# Patient Record
Sex: FEMALE | Race: WHITE | NOT HISPANIC OR LATINO | URBAN - NONMETROPOLITAN AREA
[De-identification: names, ages, dates, MRNs, and addresses within clinical notes are randomized per-mention and may not be internally consistent; named-entity substitution may affect disease eponyms.]

---

## 2017-02-02 ENCOUNTER — OFFICE VISIT (OUTPATIENT)
Dept: RETAIL CLINIC | Facility: CLINIC | Age: 57
End: 2017-02-02

## 2017-02-02 VITALS
HEART RATE: 95 BPM | DIASTOLIC BLOOD PRESSURE: 62 MMHG | OXYGEN SATURATION: 98 % | RESPIRATION RATE: 18 BRPM | TEMPERATURE: 98.9 F | HEIGHT: 65 IN | SYSTOLIC BLOOD PRESSURE: 104 MMHG | WEIGHT: 186.6 LBS | BODY MASS INDEX: 31.09 KG/M2

## 2017-02-02 DIAGNOSIS — J03.90 ACUTE TONSILLITIS, UNSPECIFIED ETIOLOGY: Primary | ICD-10-CM

## 2017-02-02 LAB
EXPIRATION DATE: NORMAL
INTERNAL CONTROL: NORMAL
Lab: NORMAL
S PYO AG THROAT QL: NEGATIVE

## 2017-02-02 PROCEDURE — 99213 OFFICE O/P EST LOW 20 MIN: CPT | Performed by: NURSE PRACTITIONER

## 2017-02-02 PROCEDURE — 87880 STREP A ASSAY W/OPTIC: CPT | Performed by: NURSE PRACTITIONER

## 2017-02-02 RX ORDER — AMOXICILLIN 875 MG/1
875 TABLET, COATED ORAL 2 TIMES DAILY
Qty: 20 TABLET | Refills: 0 | Status: SHIPPED | OUTPATIENT
Start: 2017-02-02 | End: 2017-02-12

## 2017-02-02 NOTE — PROGRESS NOTES
"Berenice Bhagat is a 56 y.o. female.     Influenza   This is a new problem. The current episode started in the past 7 days. Associated symptoms include congestion, coughing, fatigue, a fever, headaches and a sore throat. Pertinent negatives include no chills or rash. Nothing aggravates the symptoms. She has tried acetaminophen for the symptoms. The treatment provided no relief.        No Known Allergies      The following portions of the patient's history were reviewed and updated as appropriate: allergies, current medications, past family history, past medical history, past social history, past surgical history and problem list.    Review of Systems   Constitutional: Positive for fatigue and fever. Negative for chills.   HENT: Positive for congestion, postnasal drip, rhinorrhea, sore throat and trouble swallowing. Negative for ear pain and sinus pressure.    Respiratory: Positive for cough. Negative for wheezing.    Skin: Negative for rash.   Neurological: Positive for headaches.       Objective     Visit Vitals   • /62 (BP Location: Right arm)   • Pulse 95   • Temp 98.9 °F (37.2 °C) (Oral)   • Resp 18   • Ht 65\" (165.1 cm)   • Wt 186 lb 9.6 oz (84.6 kg)   • SpO2 98%   • BMI 31.05 kg/m2       Physical Exam  General Appearance:  Well-appearing, well-developed, well hydrated, well nourished, no acute distress, alert and oriented, appears stated age, comfortable, pleasant, cooperative  Head/face:  Normocephalic, atraumatic  Eyes:  Pupils equal, sclera clear, EOMI  Ears:  Bilateral TMs are dull gray with diffuse light reflex, canals are clear, no pinna or tragus tenderness with palpation  Nose/Sinuses:  Nares are mildly congested bilaterally  Mouth/Throat:  Posterior pharynx is moderately erythematous with white exudate evident on bilateral tonsils  Neck:  1-3cm bilateral anterior cervical lymphadenopathy palpable very tender  Lungs:  Clear to auscultation bilaterally  Heart:  Regular rate and rhythm " without murmur, gallop or rub  Neurologic:  Alert; no focal deficits; age appropriate behavior and speech      Assessment/Plan   Diagnoses and all orders for this visit:    Acute tonsillitis, unspecified etiology  -     POC Rapid Strep A - negative    Other orders  -     amoxicillin (AMOXIL) 875 MG tablet; Take 1 tablet by mouth 2 (Two) Times a Day for 10 days.  Discussed dosing, side effects, recommended other symptomatic care.  Patient instructions and visit summary given as documented. Patient should follow up with primary care provider if symptoms worsen, fail to resolve or other symptoms need attention. Patient/family agree to the above.            PANDA Ford

## 2017-02-02 NOTE — PATIENT INSTRUCTIONS
Tonsillitis  Tonsillitis is an infection of the throat that causes the tonsils to become red, tender, and swollen. Tonsils are collections of lymphoid tissue at the back of the throat. Each tonsil has crevices (crypts). Tonsils help fight nose and throat infections and keep infection from spreading to other parts of the body for the first 18 months of life.   CAUSES  Sudden (acute) tonsillitis is usually caused by infection with streptococcal bacteria. Long-lasting (chronic) tonsillitis occurs when the crypts of the tonsils become filled with pieces of food and bacteria, which makes it easy for the tonsils to become repeatedly infected.  SYMPTOMS   Symptoms of tonsillitis include:  · A sore throat, with possible difficulty swallowing.  · White patches on the tonsils.  · Fever.  · Tiredness.  · New episodes of snoring during sleep, when you did not snore before.  · Small, foul-smelling, yellowish-white pieces of material (tonsilloliths) that you occasionally cough up or spit out. The tonsilloliths can also cause you to have bad breath.  DIAGNOSIS  Tonsillitis can be diagnosed through a physical exam. Diagnosis can be confirmed with the results of lab tests, including a throat culture.  TREATMENT   The goals of tonsillitis treatment include the reduction of the severity and duration of symptoms and prevention of associated conditions. Symptoms of tonsillitis can be improved with the use of steroids to reduce the swelling. Tonsillitis caused by bacteria can be treated with antibiotic medicines. Usually, treatment with antibiotic medicines is started before the cause of the tonsillitis is known. However, if it is determined that the cause is not bacterial, antibiotic medicines will not treat the tonsillitis. If attacks of tonsillitis are severe and frequent, your health care provider may recommend surgery to remove the tonsils (tonsillectomy).  HOME CARE INSTRUCTIONS   · Rest as much as possible and get plenty of  sleep.  · Drink plenty of fluids. While the throat is very sore, eat soft foods or liquids, such as sherbet, soups, or instant breakfast drinks.  · Eat frozen ice pops.  · Gargle with a warm or cold liquid to help soothe the throat. Mix 1/4 teaspoon of salt and 1/4 teaspoon of baking soda in 8 oz of water.  SEEK MEDICAL CARE IF:   · Large, tender lumps develop in your neck.  · A rash develops.  · A green, yellow-brown, or bloody substance is coughed up.  · You are unable to swallow liquids or food for 24 hours.  · You notice that only one of the tonsils is swollen.  SEEK IMMEDIATE MEDICAL CARE IF:   · You develop any new symptoms such as vomiting, severe headache, stiff neck, chest pain, or trouble breathing or swallowing.  · You have severe throat pain along with drooling or voice changes.  · You have severe pain, unrelieved with recommended medications.  · You are unable to fully open the mouth.  · You develop redness, swelling, or severe pain anywhere in the neck.  · You have a fever.  MAKE SURE YOU:   · Understand these instructions.  · Will watch your condition.  · Will get help right away if you are not doing well or get worse.     This information is not intended to replace advice given to you by your health care provider. Make sure you discuss any questions you have with your health care provider.     Document Released: 09/27/2006 Document Revised: 01/08/2016 Document Reviewed: 06/06/2014  [employer] Interactive Patient Education ©2016 [employer]

## 2017-04-25 ENCOUNTER — OFFICE VISIT (OUTPATIENT)
Dept: RETAIL CLINIC | Facility: CLINIC | Age: 57
End: 2017-04-25

## 2017-04-25 VITALS
OXYGEN SATURATION: 97 % | SYSTOLIC BLOOD PRESSURE: 124 MMHG | RESPIRATION RATE: 18 BRPM | WEIGHT: 172.6 LBS | DIASTOLIC BLOOD PRESSURE: 70 MMHG | HEIGHT: 65 IN | BODY MASS INDEX: 28.76 KG/M2 | TEMPERATURE: 97 F | HEART RATE: 84 BPM

## 2017-04-25 DIAGNOSIS — J40 BRONCHITIS: Primary | ICD-10-CM

## 2017-04-25 PROCEDURE — 99213 OFFICE O/P EST LOW 20 MIN: CPT | Performed by: NURSE PRACTITIONER

## 2017-04-25 RX ORDER — ALBUTEROL SULFATE 90 UG/1
2 AEROSOL, METERED RESPIRATORY (INHALATION) EVERY 4 HOURS PRN
COMMUNITY

## 2017-04-25 RX ORDER — DOXYCYCLINE 100 MG/1
100 CAPSULE ORAL 2 TIMES DAILY
Qty: 20 CAPSULE | Refills: 0 | Status: SHIPPED | OUTPATIENT
Start: 2017-04-25 | End: 2017-05-05

## 2017-04-25 NOTE — PROGRESS NOTES
"Berenice Bhagat is a 56 y.o. female.     HPI Comments: Patient reports a 2 week history of chest congestion, cough with green sputum, occasional wheezing but no fever. Worsening now with occasional SOA. Tried mucinex for 2 days with no relief. Has not used albuterol inhaler as yet. Taking all other meds for allergies.     Cough   Associated symptoms include headaches, postnasal drip, shortness of breath and wheezing. Pertinent negatives include no chills, ear pain, fever or sore throat.        No Known Allergies      The following portions of the patient's history were reviewed and updated as appropriate: allergies, current medications, past family history, past medical history, past social history, past surgical history and problem list.    Review of Systems   Constitutional: Negative for chills and fever.   HENT: Positive for congestion and postnasal drip. Negative for ear pain, sore throat and trouble swallowing.    Respiratory: Positive for cough, chest tightness, shortness of breath and wheezing.    Neurological: Positive for headaches. Negative for dizziness.       Objective     /70 (BP Location: Right arm)  Pulse 84  Temp 97 °F (36.1 °C) (Oral)   Resp 18  Ht 65\" (165.1 cm)  Wt 172 lb 9.6 oz (78.3 kg)  SpO2 97%  BMI 28.72 kg/m2    Physical Exam  General Appearance:  Well-appearing, well-developed, well hydrated, well nourished, no acute distress, alert and oriented, appears stated age, comfortable, pleasant, cooperative  Head/face:  Normocephalic, atraumatic  Eyes:  Pupils equal, sclera clear, EOMI  Ears:  Bilateral TMs are dull gray with diffuse light reflex, canals are clear, no pinna or tragus tenderness with palpation  Nose/Sinuses:  Nares are mildly congested bilaterally  Mouth/Throat:  Posterior pharynx is mildly erythematous with a small amount of clear drainage  Neck:  Supple without lymphadenopathy or thyromegaly; trachea is midline  Lungs:  Scattered rhonchi bilateral upper " lobes with few scattered wheezes  Heart:  Regular rate and rhythm without murmur, gallop or rub  Neurologic:  Alert; no focal deficits; age appropriate behavior and speech      Assessment/Plan   Gabriella was seen today for nasal congestion and cough.    Diagnoses and all orders for this visit:    Bronchitis    Other orders  -     doxycycline (MONODOX) 100 MG capsule; Take 1 capsule by mouth 2 (Two) Times a Day for 10 days.      Continue all other meds and add albuterol prn.   You are diagnosed with bronchitis.   An antibiotic is prescribed for you today that needs to be taken until gone, whether or not you feel better. It is recommended you take a probiotic when taking an antibiotic. Probiotics are found over the counter in pill form and in some yogurt brands, both are recommended. Increase fluid intake. .Use inhaler as prescribed. If cough pills or liquid were prescribed, do not take them and drive. Avoid tobacco smoke or other irritants. Use motrin or Tylenol for fever as needed. If your symptoms worsen, wheezing or shortness of air develops or worsens,or other concerns develop, you should follow up with your primary care provider or go to the ER as you may need further diagnostics such as x-ray and blood work. You verbalized an understanding of these instructions and a copy of this visit summary is provided.         PANDA Ford

## 2017-04-25 NOTE — PATIENT INSTRUCTIONS
You are diagnosed with bronchitis.   An antibiotic is prescribed for you today that needs to be taken until gone, whether or not you feel better. It is recommended you take a probiotic when taking an antibiotic. Probiotics are found over the counter in pill form and in some yogurt brands, both are recommended. Increase fluid intake. .Use inhaler as prescribed. If cough pills or liquid were prescribed, do not take them and drive. Avoid tobacco smoke or other irritants. Use motrin or Tylenol for fever as needed. If your symptoms worsen, wheezing or shortness of air develops or worsens,or other concerns develop, you should follow up with your primary care provider or go to the ER as you may need further diagnostics such as x-ray and blood work. You verbalized an understanding of these instructions and a copy of this visit summary is provided.    PANDA Ford